# Patient Record
Sex: MALE | URBAN - METROPOLITAN AREA
[De-identification: names, ages, dates, MRNs, and addresses within clinical notes are randomized per-mention and may not be internally consistent; named-entity substitution may affect disease eponyms.]

---

## 2017-12-12 ENCOUNTER — OFFICE VISIT (OUTPATIENT)
Dept: PLASTIC SURGERY | Facility: CLINIC | Age: 32
End: 2017-12-12

## 2017-12-12 VITALS
DIASTOLIC BLOOD PRESSURE: 87 MMHG | OXYGEN SATURATION: 100 % | BODY MASS INDEX: 35.19 KG/M2 | WEIGHT: 211.2 LBS | HEIGHT: 65 IN | SYSTOLIC BLOOD PRESSURE: 124 MMHG | HEART RATE: 81 BPM

## 2017-12-12 DIAGNOSIS — F64.0 GENDER DYSPHORIA IN ADOLESCENT AND ADULT: Primary | ICD-10-CM

## 2017-12-12 RX ORDER — TESTOSTERONE CYPIONATE 200 MG/ML
50 INJECTION, SOLUTION INTRAMUSCULAR
COMMUNITY

## 2017-12-12 ASSESSMENT — PAIN SCALES - GENERAL: PAINLEVEL: NO PAIN (0)

## 2017-12-12 NOTE — LETTER
12/12/2017       RE: Krishan Lee  [id number] [facility] [address]     Dear Colleague,    Thank you for referring your patient, Krishan Lee, to the [facility] PLASTIC AND RECONSTRUCTIVE SURGERY at [facility]. Please see a copy of my visit note below.    PLASTICS NEW   Dictation on: 12/12/2017 11:20 AM by: TG HAIRSTON [[id number]]         Again, thank you for allowing me to participate in the care of your patient.      Sincerely,    Amber Hairston MD

## 2017-12-12 NOTE — NURSING NOTE
"Chief Complaint   Patient presents with     Consult     FT top surgery        Vitals:    12/12/17 0843   BP: 124/87   Pulse: 81   SpO2: 100%   Weight: 211 lb 3.2 oz   Height: 5' 5\"       Body mass index is 35.15 kg/(m^2).      Rod MOHAMUD                     "

## 2017-12-12 NOTE — PROGRESS NOTES
"PLASTICS NEW  HISTORY OF PRESENT ILLNESS:  This is a 32-year-old trans male who is here today to discuss possible top surgery.  He had his name changed about 2 years ago and prefers he/him pronouns.  I think he found us kind of just searching the Internet.  He states that he scheduled with us about 6 months ago and is just now getting his appointment.  He has been on testosterone for a little over a year.  He gets that at [facility].  These are injections and he is on a stable dose.  As far as therapy is concerned, he sees Zena Bautista who is now at the [facility] on [facility].  He had been seeing Paxton for about 2 years but his last visit was about 3 months ago.  He binds with gc2b binder and denies any breast problems.  It sounds like for the most part he is out to his family and they are doing fairly well with accepting his transition.      PAST MEDICAL HISTORY:  Significant for gender dysphonia, hypercholesterolemia and some obesity.  He denies diabetes, asthma or GERD.  He also denies anxiety and depression, although I was wondering if he had undiagnosed problem with anxiety given his demeanor in our clinic today.  He denies any problems with bleeding or clotting, healing or scarring issues.      PAST SURGICAL HISTORY:  His only past surgical history included some sort of procedure for a \"short urethra\" back in the 1st grade.  I did not understand what that might have been nor did he.        FAMILY HISTORY:  No breast or ovarian cancer in the family.  There is some diabetes in a maternal grandmother and some hypertension in a paternal uncle.  Otherwise, his parents are relatively healthy other than some skin cancer for his mom and he is the middle child of 5 with 3 brothers and 1 sister.  They are all healthy.      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  His hours are from 3-12 most nights.  He is currently " living with his parents and also pursuing a biology degree at the [employer].  He is currently single.  His diet is described as omnivorous, although he eats a lot of processed foods and a lot of carbs.  He drinks maybe 1-2 energy drinks a day, minimal coffee, minimal pop.  He smokes a third to a half pack a day and has been doing that on and off for about 15 years.  He drinks alcohol occasionally.  His only exercise is walking the dogs.  He sleeps without problems, usually from about 1 a.m. to 11 a.m.      PHYSICAL EXAMINATION:   GENERAL:  He is 5 feet 5 inches tall, 211-pound biological female who appears quite convincingly as male.  He is obese with generalized adiposity and a little bit of extra abdominal obesity.     CHEST:  His breasts are quite large and pendulous with grade 2-3 ptosis.  The left is at least a couple 100 grams greater than the right.  The areola/nipple complex is very large.  Both IMFs are at least 2-4 cm below probable inferior edge of the pectoralis.  The right IMF is at least 2 cm lower than the left.  His has mild-to-moderate lateral thoracic rolls that are basically continuation of the large breasts.  On palpation, there are fibrofatty changes.  I did not detect any suspicious masses or adenopathy.  He does have striae bilaterally.  There is mild-to-moderate pec development.  Slight depression just below the sternal notch.  Freckled skin across the anterior chest.        Photos were taken today with written consent.  Essentially, this is a good candidate for bilateral subcu mastectomies with nipple grafts.  He was a bit tensive as far as the decision about nipple grafts but I think ultimately he would prefer contour.  He will need a mammogram, given his size.  He has [facility] [place] [employer] which is essentially an MA program.  We will require at least 1 letter from Paxton, his therapist.  At this point, due to schooling, he is thinking he probably would like  to do something in the summer, maybe in June.  As soon as we get the letter, will send that in for prior authorization and then we can start looking for dates.  He understands that will need to see his primary care provider and myself again for a preop visit within 30 days of his surgical date period and he needs to have a mammogram done prior to that.  I did offer him our PAC clinic and a mammogram here in case he does not get something set up with his own primary.  Total time spent today was approximately 45 minutes, at least half of which was spent discussing surgical options and insurance issues.  Any further discussion about risks and complications and dos and don'ts of perioperative cares will be done at preop visit.  I also encouraged him to invite his mom or whoever will be taking care of him postoperatively if they have additional concerns or questions.

## 2017-12-12 NOTE — LETTER
Date:December 21, 2017      Patient was self referred, no letter generated. Do not send.        [facility] Health Information